# Patient Record
Sex: FEMALE
[De-identification: names, ages, dates, MRNs, and addresses within clinical notes are randomized per-mention and may not be internally consistent; named-entity substitution may affect disease eponyms.]

---

## 2018-03-09 ENCOUNTER — HOSPITAL ENCOUNTER (EMERGENCY)
Dept: HOSPITAL 31 - C.EROB | Age: 33
Discharge: HOME | End: 2018-03-09
Payer: COMMERCIAL

## 2018-03-09 VITALS — BODY MASS INDEX: 37.8 KG/M2

## 2018-03-09 DIAGNOSIS — R10.9: ICD-10-CM

## 2018-03-09 DIAGNOSIS — Z3A.37: ICD-10-CM

## 2018-03-09 DIAGNOSIS — O26.893: Primary | ICD-10-CM

## 2018-03-09 LAB
BACTERIA #/AREA URNS HPF: (no result) /[HPF]
BILIRUB UR-MCNC: NEGATIVE MG/DL
GLUCOSE UR STRIP-MCNC: NORMAL MG/DL
LEUKOCYTE ESTERASE UR-ACNC: (no result) LEU/UL
PH UR STRIP: 6 [PH] (ref 5–8)
PROT UR STRIP-MCNC: NEGATIVE MG/DL
RBC # UR STRIP: NEGATIVE /UL
SP GR UR STRIP: 1.02 (ref 1–1.03)
SQUAMOUS EPITHIAL: 7 /HPF (ref 0–5)
UROBILINOGEN UR-MCNC: NORMAL MG/DL (ref 0.2–1)

## 2018-03-09 PROCEDURE — 99283 EMERGENCY DEPT VISIT LOW MDM: CPT

## 2018-03-09 PROCEDURE — 81001 URINALYSIS AUTO W/SCOPE: CPT

## 2018-03-09 NOTE — OBHP
===========================

Datetime: 2018 19:57

===========================

   

IP Adm Impression:  Term, intrauterine pregnancy

Admit Comment, IP Provider:   at 37.6weeks cme with lower abdominal pin started in the afternoon,
no vb, lof+fm

   obhx 3 x 

   pmh den

   med pnv

   all nkda

   psh den

   soch de

      

   ve /-3

      

   a/p  at 37.6weeks andominal pain

   npo/ivf

   ua

   cont silvino and efm

   cont close observation

Pelvic Type - PN:  Adequate

Extremities - PN:  Normal

Abdomen - PN:  Normal

Back - PN:  Normal

Breast - PN:  Normal

Lungs - PN:  Normal

Heart - PN:  Normal

Thyroid - PN:  Normal

Neurologic - PN:  Normal

HEENT - PN:  Normal

General - PN:  Normal

FHR - Baseline A Provider:  130

Contraction Comments Provider:  q1-5

EGA AdmitDate IP:  37.6

Vital Signs Provider:  Reviewed; Within Normal Limits

IP Chief Complaint:  Uterine contractions

NICHD Variability Prov Fetus A:  Moderate 6-25bpm

Dilatation, Provider:  1

Effacement, Provider:  40

Station, Provider:  -3

Genitourinary Exam:  Normal

DTRs - PN:  Normal

## 2018-03-09 NOTE — OBDCSUM
===========================

Datetime: 03/09/2018 20:33

===========================

   

Discharged to, Provider:  Home

Follow up at, Provider:  monday

Follow up in weeks, Provider:  clinic

Disch Activity Restrictions:  No sexual activity; Nothing in vagina - Morris, tampons, douche

Discharge Comment, Provider:  dc home

   labor given

   po hyr

   f/u in clinic on monday

Discharge Diagnosis Prov Other:  falses

   abor

## 2018-03-10 VITALS
HEART RATE: 82 BPM | RESPIRATION RATE: 18 BRPM | SYSTOLIC BLOOD PRESSURE: 108 MMHG | TEMPERATURE: 97.6 F | DIASTOLIC BLOOD PRESSURE: 62 MMHG

## 2018-03-12 NOTE — OBHP
===========================

Datetime: 2018 19:57

===========================

   

Admit Comment, IP Provider:   at 37.6weeks cme with lower abdominal pin started in the afternoon,
no vb, lof+fm

   obhx 3 x 

   pmh den

   med pnv

   all nkda

   psh den

   soch de

      

   ve /-3

      

   a/p  at 37.6weeks andominal pain

   npo/ivf

   ua

   cont silvino and efm

   cont close observation

      

   after ivf 

   nst is better

      

   dc home

   labor given

   po hyr

   f/u in clinic on monday

IP Prenatal Hx Assessment:  The Prenatal History has been Reviewed and is Current

EGA AdmitDate IP:  37.6